# Patient Record
Sex: FEMALE | Race: BLACK OR AFRICAN AMERICAN | NOT HISPANIC OR LATINO | URBAN - METROPOLITAN AREA
[De-identification: names, ages, dates, MRNs, and addresses within clinical notes are randomized per-mention and may not be internally consistent; named-entity substitution may affect disease eponyms.]

---

## 2020-04-04 ENCOUNTER — INPATIENT (INPATIENT)
Facility: HOSPITAL | Age: 50
LOS: 1 days | Discharge: HOME | End: 2020-04-06
Attending: INTERNAL MEDICINE | Admitting: INTERNAL MEDICINE
Payer: COMMERCIAL

## 2020-04-04 VITALS
HEIGHT: 69 IN | RESPIRATION RATE: 20 BRPM | WEIGHT: 158.07 LBS | TEMPERATURE: 99 F | DIASTOLIC BLOOD PRESSURE: 63 MMHG | OXYGEN SATURATION: 97 % | SYSTOLIC BLOOD PRESSURE: 119 MMHG | HEART RATE: 98 BPM

## 2020-04-04 LAB
ALBUMIN SERPL ELPH-MCNC: 4.5 G/DL — SIGNIFICANT CHANGE UP (ref 3.3–5)
ALP SERPL-CCNC: 44 U/L — SIGNIFICANT CHANGE UP (ref 40–120)
ALT FLD-CCNC: 61 U/L — HIGH (ref 4–33)
ANION GAP SERPL CALC-SCNC: 15 MMO/L — HIGH (ref 7–14)
AST SERPL-CCNC: 50 U/L — HIGH (ref 4–32)
BASOPHILS # BLD AUTO: 0.03 K/UL — SIGNIFICANT CHANGE UP (ref 0–0.2)
BASOPHILS NFR BLD AUTO: 0.4 % — SIGNIFICANT CHANGE UP (ref 0–2)
BILIRUB SERPL-MCNC: 1 MG/DL — SIGNIFICANT CHANGE UP (ref 0.2–1.2)
BUN SERPL-MCNC: 7 MG/DL — SIGNIFICANT CHANGE UP (ref 7–23)
CALCIUM SERPL-MCNC: 9.6 MG/DL — SIGNIFICANT CHANGE UP (ref 8.4–10.5)
CHLORIDE SERPL-SCNC: 103 MMOL/L — SIGNIFICANT CHANGE UP (ref 98–107)
CO2 SERPL-SCNC: 21 MMOL/L — LOW (ref 22–31)
CREAT SERPL-MCNC: 0.83 MG/DL — SIGNIFICANT CHANGE UP (ref 0.5–1.3)
CRP SERPL-MCNC: < 4 MG/L — SIGNIFICANT CHANGE UP
EOSINOPHIL # BLD AUTO: 0.1 K/UL — SIGNIFICANT CHANGE UP (ref 0–0.5)
EOSINOPHIL NFR BLD AUTO: 1.2 % — SIGNIFICANT CHANGE UP (ref 0–6)
FERRITIN SERPL-MCNC: 324.2 NG/ML — HIGH (ref 15–150)
GLUCOSE SERPL-MCNC: 93 MG/DL — SIGNIFICANT CHANGE UP (ref 70–99)
HCT VFR BLD CALC: 37.9 % — SIGNIFICANT CHANGE UP (ref 34.5–45)
HGB BLD-MCNC: 12.7 G/DL — SIGNIFICANT CHANGE UP (ref 11.5–15.5)
IMM GRANULOCYTES NFR BLD AUTO: 0.7 % — SIGNIFICANT CHANGE UP (ref 0–1.5)
LYMPHOCYTES # BLD AUTO: 3.27 K/UL — SIGNIFICANT CHANGE UP (ref 1–3.3)
LYMPHOCYTES # BLD AUTO: 39.7 % — SIGNIFICANT CHANGE UP (ref 13–44)
MCHC RBC-ENTMCNC: 31.4 PG — SIGNIFICANT CHANGE UP (ref 27–34)
MCHC RBC-ENTMCNC: 33.5 % — SIGNIFICANT CHANGE UP (ref 32–36)
MCV RBC AUTO: 93.6 FL — SIGNIFICANT CHANGE UP (ref 80–100)
MONOCYTES # BLD AUTO: 0.81 K/UL — SIGNIFICANT CHANGE UP (ref 0–0.9)
MONOCYTES NFR BLD AUTO: 9.8 % — SIGNIFICANT CHANGE UP (ref 2–14)
NEUTROPHILS # BLD AUTO: 3.97 K/UL — SIGNIFICANT CHANGE UP (ref 1.8–7.4)
NEUTROPHILS NFR BLD AUTO: 48.2 % — SIGNIFICANT CHANGE UP (ref 43–77)
NRBC # FLD: 0 K/UL — SIGNIFICANT CHANGE UP (ref 0–0)
PLATELET # BLD AUTO: 312 K/UL — SIGNIFICANT CHANGE UP (ref 150–400)
PMV BLD: 9.6 FL — SIGNIFICANT CHANGE UP (ref 7–13)
POTASSIUM SERPL-MCNC: 4 MMOL/L — SIGNIFICANT CHANGE UP (ref 3.5–5.3)
POTASSIUM SERPL-SCNC: 4 MMOL/L — SIGNIFICANT CHANGE UP (ref 3.5–5.3)
PROCALCITONIN SERPL-MCNC: 0.06 NG/ML — SIGNIFICANT CHANGE UP (ref 0.02–0.1)
PROT SERPL-MCNC: 8.1 G/DL — SIGNIFICANT CHANGE UP (ref 6–8.3)
RBC # BLD: 4.05 M/UL — SIGNIFICANT CHANGE UP (ref 3.8–5.2)
RBC # FLD: 13 % — SIGNIFICANT CHANGE UP (ref 10.3–14.5)
SODIUM SERPL-SCNC: 139 MMOL/L — SIGNIFICANT CHANGE UP (ref 135–145)
WBC # BLD: 8.24 K/UL — SIGNIFICANT CHANGE UP (ref 3.8–10.5)
WBC # FLD AUTO: 8.24 K/UL — SIGNIFICANT CHANGE UP (ref 3.8–10.5)

## 2020-04-04 RX ORDER — SODIUM CHLORIDE 9 MG/ML
1000 INJECTION INTRAMUSCULAR; INTRAVENOUS; SUBCUTANEOUS ONCE
Refills: 0 | Status: COMPLETED | OUTPATIENT
Start: 2020-04-04 | End: 2020-04-04

## 2020-04-04 RX ORDER — ACETAMINOPHEN 500 MG
650 TABLET ORAL ONCE
Refills: 0 | Status: COMPLETED | OUTPATIENT
Start: 2020-04-04 | End: 2020-04-04

## 2020-04-04 RX ORDER — ONDANSETRON 8 MG/1
4 TABLET, FILM COATED ORAL ONCE
Refills: 0 | Status: COMPLETED | OUTPATIENT
Start: 2020-04-04 | End: 2020-04-04

## 2020-04-04 RX ADMIN — ONDANSETRON 4 MILLIGRAM(S): 8 TABLET, FILM COATED ORAL at 22:50

## 2020-04-04 RX ADMIN — Medication 650 MILLIGRAM(S): at 22:50

## 2020-04-04 RX ADMIN — SODIUM CHLORIDE 1000 MILLILITER(S): 9 INJECTION INTRAMUSCULAR; INTRAVENOUS; SUBCUTANEOUS at 22:50

## 2020-04-04 NOTE — ED PROVIDER NOTE - OBJECTIVE STATEMENT
48yo F w/ recent diagnosis of covid presents to the ED BIBEMS c/o worsening viral symptoms. Pt w/ intractable cough, unable to talk in full sentences without coughing paroxysms. Pt states she is relaxes and doesn't try to talk her cough isn't so severe, pt not in  ED via EMS from home.  Pt reports positive covid test today c/o SOB.  Pt took her Azithromycin today with 2 days left on script.  Pt took albuterol inhaler at 4:30 pm.  Pt reports cough started last weekend and saw PMD 3/30/20.  Pt has reports bilateral PNA as per [facility].  Pt c/o CP following coughing.  EKG in triage.  Pt ambulated to restroom without assistance.  Pt reports new onset sciatica to right side.    shortness of breath 50yo F w/ recent diagnosis of covid presents to the ED BIBEMS c/o worsening viral symptoms. Pt w/ intractable cough, unable to talk in full sentences without coughing paroxysms. Pt states she when relaxes and doesn't try to talk her cough isn't so severe. Pt reports cough started last weekend and saw PMD 3/30/20, +covid test, was prescribed Azithromycin 3 days ago, states generally feeling worse. +pleurisy. Denies dizziness, syncope, interscapular pain, N/V/D, diaphoresis, palpitations, abdominal pain, pain associated with food intake, night sweats. Patient denies recent surgery, prolonged immobility or travel, hemoptysis, hx of active ca, extremity swelling, hx of dvt/pe. No family hx of sudden cardiac death or first degree relative with dissection. Pt is a non-smoker.

## 2020-04-04 NOTE — ED PROVIDER NOTE - CLINICAL SUMMARY MEDICAL DECISION MAKING FREE TEXT BOX
Linsey: 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 Likely COVID PNA. Anti-tussive meds, CXR, labs, preg test, admit.

## 2020-04-04 NOTE — ED ADULT NURSE NOTE - OBJECTIVE STATEMENT
Pt received to CDU room 2 a/o x 3 c/o worsening cough, SOB x 1 weeks. Pt was seen at urgent care and was told she had bilateral PNA and given antibiotics. Pt was compliant with antibiotics and had 2 days left. Symptoms barely improved. Pt was told today she was COVID +. pt cough is worse when she talks. Respirations even and unlabored. No complaints of chest pain, dizziness, vomiting    fever, chills verbalized.. 22g iv placed to right AC. labs drawn and sent medication given as per order

## 2020-04-04 NOTE — ED ADULT TRIAGE NOTE - CHIEF COMPLAINT QUOTE
Pt arrives to ED via EMS from home.  Pt reports positive covid test today c/o SOB.  Pt took her Azithromycin today with 2 days left on script.  Pt took albuterol inhaler at 4:30 pm.  Pt reports cough started last weekend and saw PMD 3/30/20.  Pt has reports bilateral PNA as per [facility].  Pt c/o CP following coughing.  EKG in triage.  Pt ambulated to restroom without assistance. Pt arrives to ED via EMS from home.  Pt reports positive covid test today c/o SOB.  Pt took her Azithromycin today with 2 days left on script.  Pt took albuterol inhaler at 4:30 pm.  Pt reports cough started last weekend and saw PMD 3/30/20.  Pt has reports bilateral PNA as per [facility].  Pt c/o CP following coughing.  EKG in triage.  Pt ambulated to restroom without assistance.  Pt reports new onset sciatica to right side.

## 2020-04-04 NOTE — ED ADULT NURSE NOTE - CHIEF COMPLAINT QUOTE
Pt arrives to ED via EMS from home.  Pt reports positive covid test today c/o SOB.  Pt took her Azithromycin today with 2 days left on script.  Pt took albuterol inhaler at 4:30 pm.  Pt reports cough started last weekend and saw PMD 3/30/20.  Pt has reports bilateral PNA as per [facility].  Pt c/o CP following coughing.  EKG in triage.  Pt ambulated to restroom without assistance.  Pt reports new onset sciatica to right side.

## 2020-04-04 NOTE — ED PROVIDER NOTE - ATTENDING CONTRIBUTION TO CARE
I performed a face-to-face evaluation of the patient and performed a history and physical examination. I agree with the history and physical examination.    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 Likely COVID PNA. Anti-tussive meds, CXR, labs, preg test, admit.

## 2020-04-05 DIAGNOSIS — J18.9 PNEUMONIA, UNSPECIFIED ORGANISM: ICD-10-CM

## 2020-04-05 LAB — ERYTHROCYTE [SEDIMENTATION RATE] IN BLOOD: 40 MM/HR — HIGH (ref 4–25)

## 2020-04-05 PROCEDURE — 71045 X-RAY EXAM CHEST 1 VIEW: CPT | Mod: 26

## 2020-04-05 PROCEDURE — 99223 1ST HOSP IP/OBS HIGH 75: CPT

## 2020-04-05 RX ORDER — ONDANSETRON 8 MG/1
4 TABLET, FILM COATED ORAL ONCE
Refills: 0 | Status: COMPLETED | OUTPATIENT
Start: 2020-04-05 | End: 2020-04-06

## 2020-04-05 RX ORDER — ALBUTEROL 90 UG/1
2 AEROSOL, METERED ORAL EVERY 6 HOURS
Refills: 0 | Status: DISCONTINUED | OUTPATIENT
Start: 2020-04-05 | End: 2020-04-06

## 2020-04-05 RX ORDER — ENOXAPARIN SODIUM 100 MG/ML
40 INJECTION SUBCUTANEOUS DAILY
Refills: 0 | Status: DISCONTINUED | OUTPATIENT
Start: 2020-04-05 | End: 2020-04-06

## 2020-04-05 RX ADMIN — ALBUTEROL 2 PUFF(S): 90 AEROSOL, METERED ORAL at 15:46

## 2020-04-05 NOTE — H&P ADULT - HISTORY OF PRESENT ILLNESS
48yo F w/ recent diagnosis of covid presents to the ED BIBEMS c/o worsening viral symptoms. Pt w/ severe cough, severs doe    saw PMD 3/30/20, +covid test, was prescribed Azithromycin 3 days ago, states generally feeling worse. +pleurisy.

## 2020-04-05 NOTE — H&P ADULT - ASSESSMENT
pt w/ooutpt covid test  await covid pcr here  supp care  o2 prn  dvt proph  mdi  if stable may be able to d/c later today or tomorrow

## 2020-04-05 NOTE — H&P ADULT - ENMT
----- Message from Nathan Benjamin DO sent at 4/10/2019 12:00 PM CDT -----  Please, call the patient.   You may print off a copy to mail at the request of the patient or they can access it via portal.  With the below message.    Your screen for diabetes is negative     your red and white blood cell count all were normal.         negative No oral lesions; no gross abnormalities

## 2020-04-05 NOTE — H&P ADULT - NSHPLABSRESULTS_GEN_ALL_CORE
12.7   8.24  )-----------( 312      ( 04 Apr 2020 22:45 )             37.9       04-04    139  |  103  |  7   ----------------------------<  93  4.0   |  21<L>  |  0.83    Ca    9.6      04 Apr 2020 22:45    TPro  8.1  /  Alb  4.5  /  TBili  1.0  /  DBili  x   /  AST  50<H>  /  ALT  61<H>  /  AlkPhos  44  04-04                      Lactate Trend            CAPILLARY BLOOD GLUCOSE

## 2020-04-05 NOTE — CONSULT NOTE ADULT - SUBJECTIVE AND OBJECTIVE BOX
Pulmonary    HPI:  50yo F w/ recent diagnosis of covid presents to the ED BIBEMS c/o worsening viral symptoms. She has been treated with Azithromycin 3 days ago, states she is having increased dyspnea.    non smoker    PAST MEDICAL & SURGICAL HISTORY:  No pertinent past medical history  No significant past surgical history      No headache, conjunctivitis, chest pain, sputum production, abdominal pain, nausea, diarrhea       Vital Signs Last 24 Hrs  T(C): 36.6 (05 Apr 2020 10:31), Max: 37.2 (04 Apr 2020 20:59)  T(F): 97.8 (05 Apr 2020 10:31), Max: 98.9 (04 Apr 2020 20:59)  HR: 82 (05 Apr 2020 10:31) (75 - 98)  BP: 100/71 (05 Apr 2020 10:31) (100/71 - 119/63)  BP(mean): --  RR: 18 (05 Apr 2020 10:31) (18 - 20)  SpO2: 96% (05 Apr 2020 10:31) (96% - 100%)    lungs equal diminished unlabored  cor rrr  abd nt soft no hsm  extr no edema     04-04    139  |  103  |  7   ----------------------------<  93  4.0   |  21<L>  |  0.83    Ca    9.6      04 Apr 2020 22:45    TPro  8.1  /  Alb  4.5  /  TBili  1.0  /  DBili  x   /  AST  50<H>  /  ALT  61<H>  /  AlkPhos  44  04-04                        12.7   8.24  )-----------( 312      ( 04 Apr 2020 22:45 )             37.9   Ferritin, Serum (04.04.20 @ 22:45)    Ferritin, Serum: 324.2 ng/mL    Xray Chest 1 View-PORTABLE IMMEDIATE (04.05.20 @ 00:05) >  FINDINGS:    Streaky bibasilar opacities. No pleural effusion or pneumothorax. The heart is normal in size. No acute osseous findings.    IMPRESSION:    Streaky bibasilar opacities likely represent multifocal pneumonia.     < end of copied text >        impression    covid pneumonia  sat 96% on room air  inflammatory measures are low    monitor O2 saturation, ferritin ddimer    agree with dc home if stable in AM      Labs, meds radiographic studies reviewed    Zachariah Lopes MD    PULMONARY    MD Damian Emery MD George Haralambou, MD    [phone] Pulmonary    HPI:  48yo F w/ recent diagnosis of covid presents to the ED BIBEMS c/o worsening viral symptoms. She has been treated with Azithromycin 3 days ago, states she is having increased dyspnea.    PAST MEDICAL & SURGICAL HISTORY:  No pertinent past medical history  No significant past surgical history      No headache, conjunctivitis, chest pain, sputum production, abdominal pain, nausea, diarrhea       Vital Signs Last 24 Hrs  T(C): 36.6 (05 Apr 2020 10:31), Max: 37.2 (04 Apr 2020 20:59)  T(F): 97.8 (05 Apr 2020 10:31), Max: 98.9 (04 Apr 2020 20:59)  HR: 82 (05 Apr 2020 10:31) (75 - 98)  BP: 100/71 (05 Apr 2020 10:31) (100/71 - 119/63)  BP(mean): --  RR: 18 (05 Apr 2020 10:31) (18 - 20)  SpO2: 96% (05 Apr 2020 10:31) (96% - 100%)    lungs equal diminished unlabored  cor rrr  abd nt soft no hsm  extr no edema     04-04    139  |  103  |  7   ----------------------------<  93  4.0   |  21<L>  |  0.83    Ca    9.6      04 Apr 2020 22:45    TPro  8.1  /  Alb  4.5  /  TBili  1.0  /  DBili  x   /  AST  50<H>  /  ALT  61<H>  /  AlkPhos  44  04-04                        12.7   8.24  )-----------( 312      ( 04 Apr 2020 22:45 )             37.9   Ferritin, Serum (04.04.20 @ 22:45)    Ferritin, Serum: 324.2 ng/mL    Xray Chest 1 View-PORTABLE IMMEDIATE (04.05.20 @ 00:05) >  FINDINGS:    Streaky bibasilar opacities. No pleural effusion or pneumothorax. The heart is normal in size. No acute osseous findings.    IMPRESSION:    Streaky bibasilar opacities likely represent multifocal pneumonia.     < end of copied text >        impression    covid pneumonia  sat 96% on room air  inflammatory measures are low    monitor O2 saturation, ferritin ddimer    agree with dc home if stable in AM      Labs, meds radiographic studies reviewed    Zachariah Lopes MD    PULMONARY    MD Damian Emery MD George Haralambou, MD    [phone]

## 2020-04-06 ENCOUNTER — TRANSCRIPTION ENCOUNTER (OUTPATIENT)
Age: 50
End: 2020-04-06

## 2020-04-06 VITALS
DIASTOLIC BLOOD PRESSURE: 67 MMHG | SYSTOLIC BLOOD PRESSURE: 108 MMHG | HEART RATE: 102 BPM | OXYGEN SATURATION: 98 % | RESPIRATION RATE: 24 BRPM | TEMPERATURE: 98 F

## 2020-04-06 PROCEDURE — 99233 SBSQ HOSP IP/OBS HIGH 50: CPT

## 2020-04-06 RX ORDER — ACETAMINOPHEN 500 MG
650 TABLET ORAL EVERY 6 HOURS
Refills: 0 | Status: DISCONTINUED | OUTPATIENT
Start: 2020-04-06 | End: 2020-04-06

## 2020-04-06 RX ORDER — ONDANSETRON 8 MG/1
1 TABLET, FILM COATED ORAL
Qty: 12 | Refills: 0
Start: 2020-04-06 | End: 2020-04-08

## 2020-04-06 RX ORDER — ONDANSETRON 8 MG/1
1 TABLET, FILM COATED ORAL
Qty: 12 | Refills: 0
Start: 2020-04-06 | End: 2020-04-09

## 2020-04-06 RX ORDER — ACETAMINOPHEN 500 MG
2 TABLET ORAL
Qty: 0 | Refills: 0 | DISCHARGE
Start: 2020-04-06

## 2020-04-06 RX ORDER — ALBUTEROL 90 UG/1
2 AEROSOL, METERED ORAL
Qty: 0 | Refills: 0 | DISCHARGE
Start: 2020-04-06

## 2020-04-06 RX ADMIN — ONDANSETRON 4 MILLIGRAM(S): 8 TABLET, FILM COATED ORAL at 16:18

## 2020-04-06 RX ADMIN — Medication 650 MILLIGRAM(S): at 14:17

## 2020-04-06 NOTE — PROVIDER CONTACT NOTE (OTHER) - ASSESSMENT
Pt received her note for work before being discharged home, however patient states that it is not written correctly. Pt states that she wants the note to state that she should start from 4/6 instead of what the note say 4/4.

## 2020-04-06 NOTE — PROVIDER CONTACT NOTE (OTHER) - ASSESSMENT
Pt is asking for birth control pills because she is afraid of bleeding from her fibroids. Pt states that she takes Loloestro but was unable to bring it with her from home since she was in a rush. Pt states that she doesn't mind to take any type of birth control pills - she just wants something so that she does not start bleeding or having a heavy menses since she has fibroids.

## 2020-04-06 NOTE — PROVIDER CONTACT NOTE (OTHER) - ACTION/TREATMENT ORDERED:
ACP notified when provider came to the floor. Provider states she would have to look into it because she "does not know what effects birth control pills have with COVID medications."

## 2020-04-06 NOTE — PROGRESS NOTE ADULT - SUBJECTIVE AND OBJECTIVE BOX
CHIEF COMPLAINT:Patient is a 50y old  Female who presents with a chief complaint of Covid (06 Apr 2020 08:50)    	        PAST MEDICAL & SURGICAL HISTORY:  No pertinent past medical history  No significant past surgical history          REVIEW OF SYSTEMS:  weak  sob/ cough   CARDIOVASCULAR: No chest pain, palpitations, passing out, dizziness, or leg swelling  GASTROINTESTINAL: No abdominal or epigastric pain. No nausea, vomiting, or hematemesis; No diarrhea or constipation. No melena or hematochezia.  GENITOURINARY: No dysuria, frequency, hematuria, or incontinence  NEUROLOGICAL: No headaches, memory loss,    Medications:  MEDICATIONS  (STANDING):  enoxaparin Injectable 40 milliGRAM(s) SubCutaneous daily  ondansetron Injectable 4 milliGRAM(s) IV Push once    MEDICATIONS  (PRN):  acetaminophen   Tablet .. 650 milliGRAM(s) Oral every 6 hours PRN Mild Pain (1 - 3), Moderate Pain (4 - 6)  ALBUTerol    90 MICROgram(s) HFA Inhaler 2 Puff(s) Inhalation every 6 hours PRN Shortness of Breath  hydrocodone/homatropine Syrup 5 milliLiter(s) Oral every 4 hours PRN Cough    	    PHYSICAL EXAM:  T(C): 36.7 (04-06-20 @ 10:35), Max: 36.8 (04-05-20 @ 21:49)  HR: 102 (04-06-20 @ 10:35) (73 - 102)  BP: 108/67 (04-06-20 @ 10:35) (98/58 - 108/67)  RR: 24 (04-06-20 @ 10:35) (19 - 24)  SpO2: 98% (04-06-20 @ 10:35) (98% - 100%)  Wt(kg): --  I&O's Summary      Appearance: Normal	  HEENT:   Normal oral mucosa, PERRL, EOMI	  Lymphatic: No lymphadenopathy  Cardiovascular: Normal S1 S2, No JVD, No murmurs, No edema  Respiratory: Lungs clear to auscultation	  Psychiatry: A & O x 3, Mood & affect appropriate  Gastrointestinal:  Soft, Non-tender, + BS	  Skin: No rashes, No ecchymoses, No cyanosis	  Neurologic: Non-focal  Extremities: Normal range of motion, No clubbing, cyanosis or edema  Vascular: Peripheral pulses palpable 2+ bilaterally    TELEMETRY: 	    ECG:  	  RADIOLOGY:  OTHER: 	  	  LABS:	 	    CARDIAC MARKERS:                                12.7   8.24  )-----------( 312      ( 04 Apr 2020 22:45 )             37.9     04-04    139  |  103  |  7   ----------------------------<  93  4.0   |  21<L>  |  0.83    Ca    9.6      04 Apr 2020 22:45    TPro  8.1  /  Alb  4.5  /  TBili  1.0  /  DBili  x   /  AST  50<H>  /  ALT  61<H>  /  AlkPhos  44  04-04    proBNP:   Lipid Profile:   HgA1c:   TSH:

## 2020-04-06 NOTE — PROGRESS NOTE ADULT - ASSESSMENT
pt w/ooutpt covid test  room air sat good  supp care  o2 prn  dvt proph  mdi  d/c planning   discussed w/ pt at length

## 2020-04-06 NOTE — PROVIDER CONTACT NOTE (OTHER) - ACTION/TREATMENT ORDERED:
ACP provider notified. Provider states that the note is not going to be changed because the note has to start from the day she tested positive.

## 2020-04-06 NOTE — DISCHARGE NOTE PROVIDER - NSDCMRMEDTOKEN_GEN_ALL_CORE_FT
acetaminophen 325 mg oral tablet: 2 tab(s) orally every 6 hours, As needed, Mild Pain (1 - 3), Moderate Pain (4 - 6)  albuterol 90 mcg/inh inhalation aerosol: 2 puff(s) inhaled every 6 hours, As needed, Shortness of Breath  ondansetron 4 mg oral tablet, disintegratin tab(s) orally every 8 hours, As Needed -for nausea

## 2020-04-06 NOTE — DISCHARGE NOTE NURSING/CASE MANAGEMENT/SOCIAL WORK - PATIENT PORTAL LINK FT
You can access the FollowMyHealth Patient Portal offered by [facility] by registering at the following website: [email or web address]. By joining [facility] [employer]’s FollowMyHealth portal, you will also be able to view your health information using other applications (apps) compatible with our system.

## 2020-04-06 NOTE — DISCHARGE NOTE PROVIDER - NSFOLLOWUPCLINICS_GEN_ALL_ED_FT
[facility]  OB/GYN & Surg  [phone] [place] [address]  Phone: [phone]  Fax:   Follow Up Time:

## 2020-04-06 NOTE — PROGRESS NOTE ADULT - SUBJECTIVE AND OBJECTIVE BOX
Pulmonary    50yo F w/ recent diagnosis of covid presents to the ED BIBEMS c/o worsening viral symptoms. She has been treated with Azithromycin 3 days ago, states she is having increased dyspnea.    PAST MEDICAL & SURGICAL HISTORY:  No pertinent past medical history  No significant past surgical history      No headache, conjunctivitis, chest pain, sputum production, abdominal pain, nausea, diarrhea       CXR    bilateral basilar ground glass air space infiltrates    Ferritin, Serum: 324.2 ng/mL (04.04.20 @ 22:45)    C-Reactive Protein, Serum: < 4.0 mg/L (04.04.20 @ 22:45)    d-dimer not done    Vital Signs Last 24 Hrs  T(C): 36.7 (06 Apr 2020 10:35), Max: 36.8 (05 Apr 2020 21:49)  T(F): 98.1 (06 Apr 2020 10:35), Max: 98.2 (05 Apr 2020 21:49)  HR: 102 (06 Apr 2020 10:35) (73 - 102)  BP: 108/67 (06 Apr 2020 10:35) (98/58 - 108/67)  BP(mean): --  RR: 24 (06 Apr 2020 10:35) (19 - 24)  SpO2: 98% (06 Apr 2020 10:35) (98% - 100%)      breathes comfortably  some LOCKETT    IMPRESSION    Acute hypoxia and bilateral pneumonia presumed due to COVID-19 infection     inflammatory parameters are not excessive    continue to monitor  resp status     on hydroxychloroquine alone      Labs, meds radiographic studies reviewed    Zachariah Lopes MD    PULMONARY    MD Damian Emery MD George Haralambou, MD    [phone]

## 2020-04-06 NOTE — DISCHARGE NOTE PROVIDER - NSDCCPCAREPLAN_GEN_ALL_CORE_FT
PRINCIPAL DISCHARGE DIAGNOSIS  Diagnosis: Pneumonia  Assessment and Plan of Treatment: You have been diagnosed with the COVID-19 virus during your hospital stay. You must self quarantine to complete a 14 day time period.  Monitor for fevers, shortness of breath and cough primarily.  Monitor your temperature daily to not any changes and increases.    It has been determined that you no longer need hospitalization and can recover while remaining in self-quarantine at home. You should follow the prevention steps below until a healthcare provider or local or state health department says you can return to your normal activities.  1. You should restrict activities outside your home, except for getting medical care.  2. Do not go to work, school, or public areas.  3. Avoid using public transportation, ride-sharing, or taxis.  4. Separate yourself from other people and animals in your home.  People: As much as possible, you should stay in a specific room and away from other people in your home. Also, you should use a separate bathroom, if available.  Animals: You should restrict contact with pets and other animals while you are sick with COVID-19, just like you would around other people. Although there have not been reports of pets or other animals becoming sick with COVID-19, it is still recommended that people sick with COVID-19 limit contact with animals until more information is known about the virus.  When possible, have another member of your household care for your animals while you are sick. If you must care for your pet or be around animals while you are sick, wash your hands before and after you interact with pets and wear a facemask.  5. Call ahead before visiting your doctor.  If you have a medical appointment, call the healthcare provider and tell them that you have or may have COVID-19. This will help the healthcare provider’s office take steps to keep other people from getting infected or exposed.  6. Wear a facemask.  You should wear a facemask when you are around other people (e.g., sharing a room or vehicle) or pets and before you enter a healthcare pro      SECONDARY DISCHARGE DIAGNOSES  Diagnosis: Coronavirus infection  Assessment and Plan of Treatment:

## 2020-04-06 NOTE — DISCHARGE NOTE PROVIDER - HOSPITAL COURSE
This is a 49 year old female who was presented to [facility] ED for worsening cough, and shortness of breath after being previously diagnosed with COVID 19 on 3/30/20, having completed Azithromycin. Chest XR with streaky bibasilar opacities consistent with multifocal pneumonia. She was admitted on 4/5/20 for further evaluation. Lab work normalized, with improvement in symptoms. Afebrile, vital signs stable. respiration's easy, nonlabored with oxygen saturation 100% on room air.    Dispo: Updated Dr Base on patient's condition, with improvement in patient's over all condition and symptoms, and agrees patient is medically stable and optimized for discharge on 4/6/2020. All medications were reviewed and prescriptions were sent to mutually agreed upon pharmacy. This is a 49 year old female who was presented to [facility] ED for worsening cough, and shortness of breath after being previously diagnosed with COVID 19 on 3/30/20, having completed Azithromycin. Chest XR with streaky bibasilar opacities consistent with multifocal pneumonia. She was admitted on 4/5/20 for further evaluation. Lab work normalized, with improvement in symptoms. Afebrile, vital signs stable. respiratory status and symptoms slowly improving with oxygen saturation 95% on room air with activity.        Dispo: Updated Dr Base on patient's condition, with improvement in patient's over all condition and symptoms, and agrees patient is medically stable and optimized for discharge home on 4/6/2020. All medications were reviewed and prescriptions were sent to mutually agreed upon pharmacy.

## 2022-03-21 PROBLEM — Z78.9 OTHER SPECIFIED HEALTH STATUS: Chronic | Status: ACTIVE | Noted: 2020-04-05

## 2022-03-21 PROBLEM — Z00.00 ENCOUNTER FOR PREVENTIVE HEALTH EXAMINATION: Status: ACTIVE | Noted: 2022-03-21

## 2022-03-24 ENCOUNTER — APPOINTMENT (OUTPATIENT)
Dept: ORTHOPEDIC SURGERY | Facility: CLINIC | Age: 52
End: 2022-03-24
Payer: COMMERCIAL

## 2022-03-24 VITALS
HEART RATE: 79 BPM | HEIGHT: 69 IN | BODY MASS INDEX: 22.96 KG/M2 | WEIGHT: 155 LBS | SYSTOLIC BLOOD PRESSURE: 118 MMHG | DIASTOLIC BLOOD PRESSURE: 77 MMHG

## 2022-03-24 PROCEDURE — 99204 OFFICE O/P NEW MOD 45 MIN: CPT | Mod: 25

## 2022-03-24 PROCEDURE — 20550 NJX 1 TENDON SHEATH/LIGAMENT: CPT | Mod: 59,F8

## 2022-03-30 ENCOUNTER — APPOINTMENT (OUTPATIENT)
Dept: ORTHOPEDIC SURGERY | Facility: CLINIC | Age: 52
End: 2022-03-30
Payer: COMMERCIAL

## 2022-03-30 DIAGNOSIS — S60.212A CONTUSION OF LEFT WRIST, INITIAL ENCOUNTER: ICD-10-CM

## 2022-03-30 PROCEDURE — 73110 X-RAY EXAM OF WRIST: CPT | Mod: 26,LT

## 2022-03-30 PROCEDURE — 73130 X-RAY EXAM OF HAND: CPT | Mod: 26,LT

## 2022-03-30 PROCEDURE — 99212 OFFICE O/P EST SF 10 MIN: CPT

## 2022-08-12 ENCOUNTER — APPOINTMENT (OUTPATIENT)
Dept: ORTHOPEDIC SURGERY | Facility: CLINIC | Age: 52
End: 2022-08-12

## 2022-08-12 PROCEDURE — 20550 NJX 1 TENDON SHEATH/LIGAMENT: CPT | Mod: F2,F6,F8

## 2022-08-12 PROCEDURE — 99214 OFFICE O/P EST MOD 30 MIN: CPT | Mod: 25

## 2022-08-12 RX ORDER — MELOXICAM 15 MG/1
15 TABLET ORAL
Qty: 30 | Refills: 11 | Status: ACTIVE | COMMUNITY
Start: 2022-08-12 | End: 1900-01-01

## 2022-10-18 ENCOUNTER — NON-APPOINTMENT (OUTPATIENT)
Age: 52
End: 2022-10-18

## 2023-01-06 ENCOUNTER — APPOINTMENT (OUTPATIENT)
Dept: ORTHOPEDIC SURGERY | Facility: CLINIC | Age: 53
End: 2023-01-06
Payer: COMMERCIAL

## 2023-01-06 PROCEDURE — 99214 OFFICE O/P EST MOD 30 MIN: CPT | Mod: 25

## 2023-01-06 PROCEDURE — 20550 NJX 1 TENDON SHEATH/LIGAMENT: CPT | Mod: F8

## 2023-01-19 ENCOUNTER — APPOINTMENT (OUTPATIENT)
Dept: ORTHOPEDIC SURGERY | Facility: CLINIC | Age: 53
End: 2023-01-19
Payer: COMMERCIAL

## 2023-01-19 PROCEDURE — 26055 INCISE FINGER TENDON SHEATH: CPT | Mod: F2

## 2023-01-26 ENCOUNTER — NON-APPOINTMENT (OUTPATIENT)
Age: 53
End: 2023-01-26

## 2023-02-03 ENCOUNTER — APPOINTMENT (OUTPATIENT)
Dept: ORTHOPEDIC SURGERY | Facility: CLINIC | Age: 53
End: 2023-02-03
Payer: COMMERCIAL

## 2023-02-03 VITALS
WEIGHT: 155 LBS | HEART RATE: 98 BPM | HEIGHT: 69 IN | DIASTOLIC BLOOD PRESSURE: 81 MMHG | SYSTOLIC BLOOD PRESSURE: 123 MMHG | BODY MASS INDEX: 22.96 KG/M2

## 2023-02-03 PROCEDURE — 99024 POSTOP FOLLOW-UP VISIT: CPT

## 2023-02-03 RX ORDER — MELOXICAM 15 MG/1
15 TABLET ORAL
Qty: 30 | Refills: 11 | Status: ACTIVE | COMMUNITY
Start: 2023-02-03 | End: 1900-01-01

## 2023-03-03 ENCOUNTER — APPOINTMENT (OUTPATIENT)
Dept: ORTHOPEDIC SURGERY | Facility: CLINIC | Age: 53
End: 2023-03-03
Payer: COMMERCIAL

## 2023-03-03 DIAGNOSIS — M65.332 TRIGGER FINGER, LEFT MIDDLE FINGER: ICD-10-CM

## 2023-03-03 PROCEDURE — 99024 POSTOP FOLLOW-UP VISIT: CPT

## 2023-06-09 ENCOUNTER — APPOINTMENT (OUTPATIENT)
Dept: ORTHOPEDIC SURGERY | Facility: CLINIC | Age: 53
End: 2023-06-09
Payer: COMMERCIAL

## 2023-06-09 PROCEDURE — 99214 OFFICE O/P EST MOD 30 MIN: CPT | Mod: 25

## 2023-07-18 ENCOUNTER — APPOINTMENT (OUTPATIENT)
Dept: ORTHOPEDIC SURGERY | Facility: CLINIC | Age: 53
End: 2023-07-18
Payer: COMMERCIAL

## 2023-07-18 PROCEDURE — 26055 INCISE FINGER TENDON SHEATH: CPT | Mod: F8

## 2023-07-18 RX ORDER — IBUPROFEN 800 MG/1
800 TABLET, FILM COATED ORAL 3 TIMES DAILY
Qty: 90 | Refills: 6 | Status: ACTIVE | COMMUNITY
Start: 2023-07-18 | End: 1900-01-01

## 2023-08-01 ENCOUNTER — APPOINTMENT (OUTPATIENT)
Dept: ORTHOPEDIC SURGERY | Facility: CLINIC | Age: 53
End: 2023-08-01
Payer: COMMERCIAL

## 2023-08-01 PROCEDURE — 99024 POSTOP FOLLOW-UP VISIT: CPT

## 2023-08-08 ENCOUNTER — NON-APPOINTMENT (OUTPATIENT)
Age: 53
End: 2023-08-08

## 2023-08-24 ENCOUNTER — NON-APPOINTMENT (OUTPATIENT)
Age: 53
End: 2023-08-24

## 2023-09-08 ENCOUNTER — APPOINTMENT (OUTPATIENT)
Dept: ORTHOPEDIC SURGERY | Facility: CLINIC | Age: 53
End: 2023-09-08
Payer: COMMERCIAL

## 2023-09-08 DIAGNOSIS — M65.341 TRIGGER FINGER, RIGHT RING FINGER: ICD-10-CM

## 2023-09-08 DIAGNOSIS — M65.321 TRIGGER FINGER, RIGHT INDEX FINGER: ICD-10-CM

## 2023-09-08 PROCEDURE — 99024 POSTOP FOLLOW-UP VISIT: CPT

## 2023-09-08 RX ORDER — METHYLPREDNISOLONE 4 MG/1
4 TABLET ORAL
Qty: 1 | Refills: 1 | Status: ACTIVE | COMMUNITY
Start: 2023-09-08 | End: 1900-01-01

## 2025-04-15 ENCOUNTER — NON-APPOINTMENT (OUTPATIENT)
Age: 55
End: 2025-04-15